# Patient Record
Sex: FEMALE | Race: WHITE | NOT HISPANIC OR LATINO
[De-identification: names, ages, dates, MRNs, and addresses within clinical notes are randomized per-mention and may not be internally consistent; named-entity substitution may affect disease eponyms.]

---

## 2017-08-30 ENCOUNTER — RESULT REVIEW (OUTPATIENT)
Age: 22
End: 2017-08-30

## 2017-10-17 PROBLEM — Z00.00 ENCOUNTER FOR PREVENTIVE HEALTH EXAMINATION: Status: ACTIVE | Noted: 2017-10-17

## 2017-11-08 ENCOUNTER — ASOB RESULT (OUTPATIENT)
Age: 22
End: 2017-11-08

## 2017-11-08 ENCOUNTER — APPOINTMENT (OUTPATIENT)
Dept: ANTEPARTUM | Facility: CLINIC | Age: 22
End: 2017-11-08
Payer: COMMERCIAL

## 2017-11-08 PROCEDURE — 76811 OB US DETAILED SNGL FETUS: CPT

## 2018-03-06 ENCOUNTER — APPOINTMENT (OUTPATIENT)
Dept: ANTEPARTUM | Facility: CLINIC | Age: 23
End: 2018-03-06
Payer: COMMERCIAL

## 2018-03-06 ENCOUNTER — ASOB RESULT (OUTPATIENT)
Age: 23
End: 2018-03-06

## 2018-03-06 PROCEDURE — 76816 OB US FOLLOW-UP PER FETUS: CPT

## 2018-03-22 ENCOUNTER — APPOINTMENT (OUTPATIENT)
Dept: ANTEPARTUM | Facility: CLINIC | Age: 23
End: 2018-03-22
Payer: COMMERCIAL

## 2018-03-22 ENCOUNTER — ASOB RESULT (OUTPATIENT)
Age: 23
End: 2018-03-22

## 2018-03-22 ENCOUNTER — OUTPATIENT (OUTPATIENT)
Dept: OUTPATIENT SERVICES | Facility: HOSPITAL | Age: 23
LOS: 1 days | End: 2018-03-22
Payer: COMMERCIAL

## 2018-03-22 DIAGNOSIS — O40.3XX0 POLYHYDRAMNIOS, THIRD TRIMESTER, NOT APPLICABLE OR UNSPECIFIED: ICD-10-CM

## 2018-03-22 DIAGNOSIS — O48.0 POST-TERM PREGNANCY: ICD-10-CM

## 2018-03-22 PROCEDURE — 76818 FETAL BIOPHYS PROFILE W/NST: CPT | Mod: 26

## 2018-03-22 PROCEDURE — 76818 FETAL BIOPHYS PROFILE W/NST: CPT

## 2018-03-26 ENCOUNTER — ASOB RESULT (OUTPATIENT)
Age: 23
End: 2018-03-26

## 2018-03-26 ENCOUNTER — APPOINTMENT (OUTPATIENT)
Dept: ANTEPARTUM | Facility: CLINIC | Age: 23
End: 2018-03-26
Payer: COMMERCIAL

## 2018-03-26 ENCOUNTER — APPOINTMENT (OUTPATIENT)
Dept: ANTEPARTUM | Facility: CLINIC | Age: 23
End: 2018-03-26

## 2018-03-26 ENCOUNTER — OUTPATIENT (OUTPATIENT)
Dept: OUTPATIENT SERVICES | Facility: HOSPITAL | Age: 23
LOS: 1 days | End: 2018-03-26
Payer: COMMERCIAL

## 2018-03-26 PROCEDURE — 76818 FETAL BIOPHYS PROFILE W/NST: CPT

## 2018-03-26 PROCEDURE — 76816 OB US FOLLOW-UP PER FETUS: CPT | Mod: 26

## 2018-03-26 PROCEDURE — 76818 FETAL BIOPHYS PROFILE W/NST: CPT | Mod: 26

## 2018-03-26 PROCEDURE — 76816 OB US FOLLOW-UP PER FETUS: CPT

## 2018-03-27 ENCOUNTER — INPATIENT (INPATIENT)
Facility: HOSPITAL | Age: 23
LOS: 1 days | Discharge: HOME | End: 2018-03-29
Attending: OBSTETRICS & GYNECOLOGY | Admitting: OBSTETRICS & GYNECOLOGY
Payer: COMMERCIAL

## 2018-03-27 VITALS
SYSTOLIC BLOOD PRESSURE: 124 MMHG | HEART RATE: 72 BPM | OXYGEN SATURATION: 100 % | RESPIRATION RATE: 18 BRPM | TEMPERATURE: 98 F | DIASTOLIC BLOOD PRESSURE: 80 MMHG

## 2018-03-27 DIAGNOSIS — Z3A.00 WEEKS OF GESTATION OF PREGNANCY NOT SPECIFIED: ICD-10-CM

## 2018-03-27 DIAGNOSIS — O26.899 OTHER SPECIFIED PREGNANCY RELATED CONDITIONS, UNSPECIFIED TRIMESTER: ICD-10-CM

## 2018-03-27 DIAGNOSIS — Z34.80 ENCOUNTER FOR SUPERVISION OF OTHER NORMAL PREGNANCY, UNSPECIFIED TRIMESTER: ICD-10-CM

## 2018-03-27 LAB
BASOPHILS # BLD AUTO: 0 K/UL — SIGNIFICANT CHANGE UP (ref 0–0.2)
BASOPHILS NFR BLD AUTO: 0.2 % — SIGNIFICANT CHANGE UP (ref 0–2)
BLD GP AB SCN SERPL QL: NEGATIVE — SIGNIFICANT CHANGE UP
EOSINOPHIL # BLD AUTO: 0 K/UL — SIGNIFICANT CHANGE UP (ref 0–0.5)
EOSINOPHIL NFR BLD AUTO: 0.2 % — SIGNIFICANT CHANGE UP (ref 0–6)
HCT VFR BLD CALC: 38.6 % — SIGNIFICANT CHANGE UP (ref 34.5–45)
HGB BLD-MCNC: 13 G/DL — SIGNIFICANT CHANGE UP (ref 11.5–15.5)
LYMPHOCYTES # BLD AUTO: 1.1 K/UL — SIGNIFICANT CHANGE UP (ref 1–3.3)
LYMPHOCYTES # BLD AUTO: 9.4 % — LOW (ref 13–44)
MCHC RBC-ENTMCNC: 28.8 PG — SIGNIFICANT CHANGE UP (ref 27–34)
MCHC RBC-ENTMCNC: 33.7 GM/DL — SIGNIFICANT CHANGE UP (ref 32–36)
MCV RBC AUTO: 85.5 FL — SIGNIFICANT CHANGE UP (ref 80–100)
MONOCYTES # BLD AUTO: 0.6 K/UL — SIGNIFICANT CHANGE UP (ref 0–0.9)
MONOCYTES NFR BLD AUTO: 5.3 % — SIGNIFICANT CHANGE UP (ref 2–14)
NEUTROPHILS # BLD AUTO: 9.6 K/UL — HIGH (ref 1.8–7.4)
NEUTROPHILS NFR BLD AUTO: 84.8 % — HIGH (ref 43–77)
PLATELET # BLD AUTO: 85 K/UL — LOW (ref 150–400)
RBC # BLD: 4.51 M/UL — SIGNIFICANT CHANGE UP (ref 3.8–5.2)
RBC # FLD: 12.4 % — SIGNIFICANT CHANGE UP (ref 10.3–14.5)
RH IG SCN BLD-IMP: POSITIVE — SIGNIFICANT CHANGE UP
WBC # BLD: 11.3 K/UL — HIGH (ref 3.8–10.5)
WBC # FLD AUTO: 11.3 K/UL — HIGH (ref 3.8–10.5)

## 2018-03-27 RX ORDER — AER TRAVELER 0.5 G/1
1 SOLUTION RECTAL; TOPICAL EVERY 4 HOURS
Qty: 0 | Refills: 0 | Status: DISCONTINUED | OUTPATIENT
Start: 2018-03-27 | End: 2018-03-29

## 2018-03-27 RX ORDER — IBUPROFEN 200 MG
600 TABLET ORAL EVERY 6 HOURS
Qty: 0 | Refills: 0 | Status: DISCONTINUED | OUTPATIENT
Start: 2018-03-27 | End: 2018-03-29

## 2018-03-27 RX ORDER — GLYCERIN ADULT
1 SUPPOSITORY, RECTAL RECTAL AT BEDTIME
Qty: 0 | Refills: 0 | Status: DISCONTINUED | OUTPATIENT
Start: 2018-03-27 | End: 2018-03-29

## 2018-03-27 RX ORDER — DIBUCAINE 1 %
1 OINTMENT (GRAM) RECTAL EVERY 4 HOURS
Qty: 0 | Refills: 0 | Status: DISCONTINUED | OUTPATIENT
Start: 2018-03-27 | End: 2018-03-29

## 2018-03-27 RX ORDER — OXYTOCIN 10 UNIT/ML
333.33 VIAL (ML) INJECTION
Qty: 20 | Refills: 0 | Status: DISCONTINUED | OUTPATIENT
Start: 2018-03-27 | End: 2018-03-27

## 2018-03-27 RX ORDER — OXYCODONE HYDROCHLORIDE 5 MG/1
5 TABLET ORAL EVERY 4 HOURS
Qty: 0 | Refills: 0 | Status: DISCONTINUED | OUTPATIENT
Start: 2018-03-27 | End: 2018-03-27

## 2018-03-27 RX ORDER — HYDROCORTISONE 1 %
1 OINTMENT (GRAM) TOPICAL EVERY 4 HOURS
Qty: 0 | Refills: 0 | Status: DISCONTINUED | OUTPATIENT
Start: 2018-03-27 | End: 2018-03-27

## 2018-03-27 RX ORDER — ACETAMINOPHEN 500 MG
975 TABLET ORAL EVERY 6 HOURS
Qty: 0 | Refills: 0 | Status: COMPLETED | OUTPATIENT
Start: 2018-03-27 | End: 2019-02-23

## 2018-03-27 RX ORDER — SODIUM CHLORIDE 9 MG/ML
1000 INJECTION, SOLUTION INTRAVENOUS ONCE
Qty: 0 | Refills: 0 | Status: DISCONTINUED | OUTPATIENT
Start: 2018-03-27 | End: 2018-03-27

## 2018-03-27 RX ORDER — MAGNESIUM HYDROXIDE 400 MG/1
30 TABLET, CHEWABLE ORAL
Qty: 0 | Refills: 0 | Status: DISCONTINUED | OUTPATIENT
Start: 2018-03-27 | End: 2018-03-29

## 2018-03-27 RX ORDER — TETANUS TOXOID, REDUCED DIPHTHERIA TOXOID AND ACELLULAR PERTUSSIS VACCINE, ADSORBED 5; 2.5; 8; 8; 2.5 [IU]/.5ML; [IU]/.5ML; UG/.5ML; UG/.5ML; UG/.5ML
0.5 SUSPENSION INTRAMUSCULAR ONCE
Qty: 0 | Refills: 0 | Status: DISCONTINUED | OUTPATIENT
Start: 2018-03-27 | End: 2018-03-29

## 2018-03-27 RX ORDER — PRAMOXINE HYDROCHLORIDE 150 MG/15G
1 AEROSOL, FOAM RECTAL EVERY 4 HOURS
Qty: 0 | Refills: 0 | Status: DISCONTINUED | OUTPATIENT
Start: 2018-03-27 | End: 2018-03-27

## 2018-03-27 RX ORDER — SODIUM CHLORIDE 9 MG/ML
3 INJECTION INTRAMUSCULAR; INTRAVENOUS; SUBCUTANEOUS EVERY 8 HOURS
Qty: 0 | Refills: 0 | Status: DISCONTINUED | OUTPATIENT
Start: 2018-03-27 | End: 2018-03-27

## 2018-03-27 RX ORDER — SIMETHICONE 80 MG/1
80 TABLET, CHEWABLE ORAL EVERY 6 HOURS
Qty: 0 | Refills: 0 | Status: DISCONTINUED | OUTPATIENT
Start: 2018-03-27 | End: 2018-03-29

## 2018-03-27 RX ORDER — SODIUM CHLORIDE 9 MG/ML
1000 INJECTION, SOLUTION INTRAVENOUS
Qty: 0 | Refills: 0 | Status: DISCONTINUED | OUTPATIENT
Start: 2018-03-27 | End: 2018-03-27

## 2018-03-27 RX ORDER — DIBUCAINE 1 %
1 OINTMENT (GRAM) RECTAL EVERY 4 HOURS
Qty: 0 | Refills: 0 | Status: DISCONTINUED | OUTPATIENT
Start: 2018-03-27 | End: 2018-03-27

## 2018-03-27 RX ORDER — AER TRAVELER 0.5 G/1
1 SOLUTION RECTAL; TOPICAL EVERY 4 HOURS
Qty: 0 | Refills: 0 | Status: DISCONTINUED | OUTPATIENT
Start: 2018-03-27 | End: 2018-03-27

## 2018-03-27 RX ORDER — PRAMOXINE HYDROCHLORIDE 150 MG/15G
1 AEROSOL, FOAM RECTAL EVERY 4 HOURS
Qty: 0 | Refills: 0 | Status: DISCONTINUED | OUTPATIENT
Start: 2018-03-27 | End: 2018-03-29

## 2018-03-27 RX ORDER — LANOLIN
1 OINTMENT (GRAM) TOPICAL EVERY 6 HOURS
Qty: 0 | Refills: 0 | Status: DISCONTINUED | OUTPATIENT
Start: 2018-03-27 | End: 2018-03-29

## 2018-03-27 RX ORDER — DOCUSATE SODIUM 100 MG
100 CAPSULE ORAL
Qty: 0 | Refills: 0 | Status: DISCONTINUED | OUTPATIENT
Start: 2018-03-27 | End: 2018-03-29

## 2018-03-27 RX ORDER — OXYTOCIN 10 UNIT/ML
41.67 VIAL (ML) INJECTION
Qty: 20 | Refills: 0 | Status: DISCONTINUED | OUTPATIENT
Start: 2018-03-27 | End: 2018-03-27

## 2018-03-27 RX ORDER — SODIUM CHLORIDE 9 MG/ML
3 INJECTION INTRAMUSCULAR; INTRAVENOUS; SUBCUTANEOUS EVERY 8 HOURS
Qty: 0 | Refills: 0 | Status: DISCONTINUED | OUTPATIENT
Start: 2018-03-27 | End: 2018-03-29

## 2018-03-27 RX ORDER — CITRIC ACID/SODIUM CITRATE 300-500 MG
15 SOLUTION, ORAL ORAL EVERY 4 HOURS
Qty: 0 | Refills: 0 | Status: DISCONTINUED | OUTPATIENT
Start: 2018-03-27 | End: 2018-03-27

## 2018-03-27 RX ORDER — IBUPROFEN 200 MG
600 TABLET ORAL EVERY 6 HOURS
Qty: 0 | Refills: 0 | Status: COMPLETED | OUTPATIENT
Start: 2018-03-27 | End: 2019-02-23

## 2018-03-27 RX ORDER — ACETAMINOPHEN 500 MG
975 TABLET ORAL EVERY 6 HOURS
Qty: 0 | Refills: 0 | Status: DISCONTINUED | OUTPATIENT
Start: 2018-03-27 | End: 2018-03-29

## 2018-03-27 RX ORDER — OXYTOCIN 10 UNIT/ML
41.67 VIAL (ML) INJECTION
Qty: 20 | Refills: 0 | Status: DISCONTINUED | OUTPATIENT
Start: 2018-03-27 | End: 2018-03-29

## 2018-03-27 RX ORDER — HYDROCORTISONE 1 %
1 OINTMENT (GRAM) TOPICAL EVERY 4 HOURS
Qty: 0 | Refills: 0 | Status: DISCONTINUED | OUTPATIENT
Start: 2018-03-27 | End: 2018-03-29

## 2018-03-27 RX ORDER — KETOROLAC TROMETHAMINE 30 MG/ML
30 SYRINGE (ML) INJECTION ONCE
Qty: 0 | Refills: 0 | Status: DISCONTINUED | OUTPATIENT
Start: 2018-03-27 | End: 2018-03-29

## 2018-03-27 RX ORDER — OXYCODONE HYDROCHLORIDE 5 MG/1
5 TABLET ORAL
Qty: 0 | Refills: 0 | Status: DISCONTINUED | OUTPATIENT
Start: 2018-03-27 | End: 2018-03-27

## 2018-03-27 RX ORDER — DIPHENHYDRAMINE HCL 50 MG
25 CAPSULE ORAL EVERY 6 HOURS
Qty: 0 | Refills: 0 | Status: DISCONTINUED | OUTPATIENT
Start: 2018-03-27 | End: 2018-03-29

## 2018-03-27 RX ADMIN — Medication 600 MILLIGRAM(S): at 19:30

## 2018-03-27 RX ADMIN — Medication 600 MILLIGRAM(S): at 17:51

## 2018-03-27 RX ADMIN — SODIUM CHLORIDE 2000 MILLILITER(S): 9 INJECTION, SOLUTION INTRAVENOUS at 10:33

## 2018-03-27 RX ADMIN — Medication 100 MILLIGRAM(S): at 20:07

## 2018-03-27 RX ADMIN — Medication 975 MILLIGRAM(S): at 18:36

## 2018-03-27 RX ADMIN — Medication 975 MILLIGRAM(S): at 23:12

## 2018-03-27 RX ADMIN — Medication 125 MILLIUNIT(S)/MIN: at 12:45

## 2018-03-27 RX ADMIN — Medication 600 MILLIGRAM(S): at 23:11

## 2018-03-27 RX ADMIN — Medication 1 TABLET(S): at 17:51

## 2018-03-27 RX ADMIN — Medication 975 MILLIGRAM(S): at 19:30

## 2018-03-28 ENCOUNTER — TRANSCRIPTION ENCOUNTER (OUTPATIENT)
Age: 23
End: 2018-03-28

## 2018-03-28 LAB
HCT VFR BLD CALC: 30.6 % — LOW (ref 34.5–45)
HGB BLD-MCNC: 10.3 G/DL — LOW (ref 11.5–15.5)
T PALLIDUM AB TITR SER: NEGATIVE — SIGNIFICANT CHANGE UP

## 2018-03-28 RX ADMIN — Medication 975 MILLIGRAM(S): at 00:05

## 2018-03-28 RX ADMIN — Medication 600 MILLIGRAM(S): at 00:05

## 2018-03-28 RX ADMIN — Medication 600 MILLIGRAM(S): at 04:43

## 2018-03-28 RX ADMIN — Medication 975 MILLIGRAM(S): at 18:54

## 2018-03-28 RX ADMIN — Medication 975 MILLIGRAM(S): at 12:30

## 2018-03-28 RX ADMIN — Medication 600 MILLIGRAM(S): at 18:48

## 2018-03-28 RX ADMIN — Medication 1 TABLET(S): at 11:47

## 2018-03-28 RX ADMIN — Medication 600 MILLIGRAM(S): at 11:47

## 2018-03-28 RX ADMIN — Medication 600 MILLIGRAM(S): at 05:38

## 2018-03-28 RX ADMIN — Medication 600 MILLIGRAM(S): at 23:04

## 2018-03-28 RX ADMIN — Medication 650 MILLIGRAM(S): at 04:45

## 2018-03-28 RX ADMIN — Medication 600 MILLIGRAM(S): at 18:54

## 2018-03-28 RX ADMIN — Medication 600 MILLIGRAM(S): at 12:30

## 2018-03-28 RX ADMIN — Medication 975 MILLIGRAM(S): at 23:04

## 2018-03-28 RX ADMIN — Medication 975 MILLIGRAM(S): at 11:47

## 2018-03-28 RX ADMIN — Medication 975 MILLIGRAM(S): at 05:37

## 2018-03-28 RX ADMIN — Medication 975 MILLIGRAM(S): at 18:49

## 2018-03-28 NOTE — PROGRESS NOTE ADULT - PROBLEM SELECTOR PLAN 1
- Continue with po analgesia  - Increase ambulation  - Continue regular diet  - IV lock  - H&H today    JONA Cortez [email or web address]

## 2018-03-28 NOTE — DISCHARGE NOTE OB - PATIENT PORTAL LINK FT
You can access the [facility] Patient Portal, offered by [facility], by registering with the following website: [email or web address]

## 2018-03-28 NOTE — DISCHARGE NOTE OB - CARE PROVIDER_API CALL
Coretta Fernandez), Obstetrics and Gynecology  [address]  [address]  [place] [address]  Phone: [phone]  Fax: [phone]

## 2018-03-28 NOTE — DISCHARGE NOTE OB - MATERIALS PROVIDED
Breastfeeding Mother’s Support Group Information/[facility]  Screening Program/Breastfeeding Log/Bottle Feeding Log/Shaken Baby Prevention Handout/Birth Certificate Instructions/Guide to Postpartum Care/[facility] Hearing Screen Program/Back To Sleep Handout/Breastfeeding Guide and Packet

## 2018-03-28 NOTE — PROGRESS NOTE ADULT - SUBJECTIVE AND OBJECTIVE BOX
Patient seen and examined at bedside, no acute overnight events. No acute complaints, pain well controlled. Patient is ambulating, voiding spontaneously, passing gas, and tolerating regular diet. Vaginal bleeding is appropriate.     Vital Signs Last 24 Hours  T(C): 36.6 (03-28-18 @ 05:00), Max: 36.7 (03-27-18 @ 11:45)  HR: 78 (03-28-18 @ 05:00) (68 - 82)  BP: 102/64 (03-28-18 @ 05:00) (102/64 - 124/80)  RR: 18 (03-28-18 @ 05:00) (16 - 18)  SpO2: 99% (03-27-18 @ 21:23) (98% - 100%)    Physical Exam:  General: NAD  Abdomen: Soft, non-tender, non-distended, fundus firm  Pelvic: Lochia wnl    Labs:    Blood Type: A Positive  Antibody Screen: --  RPR: Negative               13.0   11.3  )-----------( 85       ( 03-27 @ 10:48 )             38.6         MEDICATIONS  (STANDING):  acetaminophen   Tablet. 975 milliGRAM(s) Oral every 6 hours  diphtheria/tetanus/pertussis (acellular) Vaccine (ADAcel) 0.5 milliLiter(s) IntraMuscular once  ibuprofen  Tablet 600 milliGRAM(s) Oral every 6 hours  ketorolac   Injectable 30 milliGRAM(s) IV Push once  oxytocin Infusion 41.667 milliUNIT(s)/Min (125 mL/Hr) IV Continuous <Continuous>  prenatal multivitamin 1 Tablet(s) Oral daily  sodium chloride 0.9% lock flush 3 milliLiter(s) IV Push every 8 hours    MEDICATIONS  (PRN):  dibucaine 1% Ointment 1 Application(s) Topical every 4 hours PRN Perineal Discomfort  diphenhydrAMINE   Capsule 25 milliGRAM(s) Oral every 6 hours PRN Itching  docusate sodium 100 milliGRAM(s) Oral two times a day PRN Stool Softening  glycerin Suppository - Adult 1 Suppository(s) Rectal at bedtime PRN Constipation  hydrocortisone 1% Cream 1 Application(s) Topical every 4 hours PRN Moderate to Severe Perineal Pain  lanolin Ointment 1 Application(s) Topical every 6 hours PRN Sore Nipples  magnesium hydroxide Suspension 30 milliLiter(s) Oral two times a day PRN Constipation  pramoxine 1%/zinc 5% Cream 1 Application(s) Topical every 4 hours PRN Moderate to Severe Perineal Pain  simethicone 80 milliGRAM(s) Chew every 6 hours PRN Gas  witch hazel Pads 1 Application(s) Topical every 4 hours PRN Perineal Discomfort

## 2018-03-28 NOTE — PROGRESS NOTE ADULT - SUBJECTIVE AND OBJECTIVE BOX
VS: Vital Signs Last 24 Hrs  T(C): 36.6 (28 Mar 2018 05:00), Max: 36.7 (27 Mar 2018 11:45)  T(F): 97.9 (28 Mar 2018 05:00), Max: 98.1 (27 Mar 2018 11:45)  HR: 78 (28 Mar 2018 05:00) (68 - 82)  BP: 102/64 (28 Mar 2018 05:00) (102/64 - 124/80)  BP(mean): 90 (27 Mar 2018 12:33) (84 - 90)  RR: 18 (28 Mar 2018 05:00) (16 - 18)  SpO2: 99% (27 Mar 2018 21:23) (98% - 100%)    Abdomen soft, fundus firm  Lochia WNL  Voiding, stable

## 2018-03-29 ENCOUNTER — APPOINTMENT (OUTPATIENT)
Dept: ANTEPARTUM | Facility: CLINIC | Age: 23
End: 2018-03-29

## 2018-03-29 VITALS
TEMPERATURE: 98 F | SYSTOLIC BLOOD PRESSURE: 106 MMHG | HEART RATE: 87 BPM | RESPIRATION RATE: 18 BRPM | DIASTOLIC BLOOD PRESSURE: 71 MMHG

## 2018-03-29 PROCEDURE — 59050 FETAL MONITOR W/REPORT: CPT

## 2018-03-29 PROCEDURE — 85027 COMPLETE CBC AUTOMATED: CPT

## 2018-03-29 PROCEDURE — 86780 TREPONEMA PALLIDUM: CPT

## 2018-03-29 PROCEDURE — G0463: CPT

## 2018-03-29 PROCEDURE — 85018 HEMOGLOBIN: CPT

## 2018-03-29 PROCEDURE — 86901 BLOOD TYPING SEROLOGIC RH(D): CPT

## 2018-03-29 PROCEDURE — 59025 FETAL NON-STRESS TEST: CPT

## 2018-03-29 PROCEDURE — 86900 BLOOD TYPING SEROLOGIC ABO: CPT

## 2018-03-29 PROCEDURE — 86850 RBC ANTIBODY SCREEN: CPT

## 2018-03-29 RX ADMIN — Medication 100 MILLIGRAM(S): at 06:01

## 2018-03-29 RX ADMIN — Medication 975 MILLIGRAM(S): at 12:37

## 2018-03-29 RX ADMIN — Medication 600 MILLIGRAM(S): at 00:00

## 2018-03-29 RX ADMIN — Medication 975 MILLIGRAM(S): at 11:44

## 2018-03-29 RX ADMIN — Medication 975 MILLIGRAM(S): at 05:59

## 2018-03-29 RX ADMIN — Medication 600 MILLIGRAM(S): at 12:37

## 2018-03-29 RX ADMIN — Medication 600 MILLIGRAM(S): at 11:44

## 2018-03-29 RX ADMIN — Medication 100 MILLIGRAM(S): at 11:44

## 2018-03-29 RX ADMIN — Medication 600 MILLIGRAM(S): at 05:59

## 2018-03-29 RX ADMIN — Medication 1 TABLET(S): at 11:44

## 2018-03-29 RX ADMIN — Medication 975 MILLIGRAM(S): at 00:00

## 2018-04-20 DIAGNOSIS — Z01.818 ENCOUNTER FOR OTHER PREPROCEDURAL EXAMINATION: ICD-10-CM

## 2018-05-03 DIAGNOSIS — Z01.818 ENCOUNTER FOR OTHER PREPROCEDURAL EXAMINATION: ICD-10-CM

## 2018-05-04 DIAGNOSIS — O48.0 POST-TERM PREGNANCY: ICD-10-CM

## 2019-12-19 ENCOUNTER — APPOINTMENT (OUTPATIENT)
Dept: ANTEPARTUM | Facility: CLINIC | Age: 24
End: 2019-12-19

## 2020-01-09 ENCOUNTER — OUTPATIENT (OUTPATIENT)
Dept: OUTPATIENT SERVICES | Facility: HOSPITAL | Age: 25
LOS: 1 days | End: 2020-01-09
Payer: COMMERCIAL

## 2020-01-09 ENCOUNTER — APPOINTMENT (OUTPATIENT)
Dept: ANTEPARTUM | Facility: CLINIC | Age: 25
End: 2020-01-09
Payer: COMMERCIAL

## 2020-01-09 ENCOUNTER — ASOB RESULT (OUTPATIENT)
Age: 25
End: 2020-01-09

## 2020-01-09 DIAGNOSIS — Z01.818 ENCOUNTER FOR OTHER PREPROCEDURAL EXAMINATION: ICD-10-CM

## 2020-01-09 PROCEDURE — 76818 FETAL BIOPHYS PROFILE W/NST: CPT

## 2020-01-09 PROCEDURE — 76816 OB US FOLLOW-UP PER FETUS: CPT

## 2020-01-09 PROCEDURE — 76818 FETAL BIOPHYS PROFILE W/NST: CPT | Mod: 26

## 2020-01-10 ENCOUNTER — INPATIENT (INPATIENT)
Facility: HOSPITAL | Age: 25
LOS: 1 days | Discharge: HOME | End: 2020-01-12
Attending: OBSTETRICS & GYNECOLOGY | Admitting: OBSTETRICS & GYNECOLOGY
Payer: COMMERCIAL

## 2020-01-10 VITALS
OXYGEN SATURATION: 98 % | HEART RATE: 74 BPM | DIASTOLIC BLOOD PRESSURE: 67 MMHG | RESPIRATION RATE: 20 BRPM | TEMPERATURE: 98 F | SYSTOLIC BLOOD PRESSURE: 121 MMHG

## 2020-01-10 DIAGNOSIS — O26.899 OTHER SPECIFIED PREGNANCY RELATED CONDITIONS, UNSPECIFIED TRIMESTER: ICD-10-CM

## 2020-01-10 DIAGNOSIS — Z34.80 ENCOUNTER FOR SUPERVISION OF OTHER NORMAL PREGNANCY, UNSPECIFIED TRIMESTER: ICD-10-CM

## 2020-01-10 DIAGNOSIS — Z3A.00 WEEKS OF GESTATION OF PREGNANCY NOT SPECIFIED: ICD-10-CM

## 2020-01-10 LAB
BASOPHILS # BLD AUTO: 0 K/UL — SIGNIFICANT CHANGE UP (ref 0–0.2)
BASOPHILS NFR BLD AUTO: 0 % — SIGNIFICANT CHANGE UP (ref 0–2)
BLD GP AB SCN SERPL QL: NEGATIVE — SIGNIFICANT CHANGE UP
EOSINOPHIL # BLD AUTO: 0.25 K/UL — SIGNIFICANT CHANGE UP (ref 0–0.5)
EOSINOPHIL NFR BLD AUTO: 2 % — SIGNIFICANT CHANGE UP (ref 0–6)
HCT VFR BLD CALC: 38.8 % — SIGNIFICANT CHANGE UP (ref 34.5–45)
HGB BLD-MCNC: 11.9 G/DL — SIGNIFICANT CHANGE UP (ref 11.5–15.5)
LYMPHOCYTES # BLD AUTO: 1.86 K/UL — SIGNIFICANT CHANGE UP (ref 1–3.3)
LYMPHOCYTES # BLD AUTO: 15 % — SIGNIFICANT CHANGE UP (ref 13–44)
MCHC RBC-ENTMCNC: 26.5 PG — LOW (ref 27–34)
MCHC RBC-ENTMCNC: 30.7 GM/DL — LOW (ref 32–36)
MCV RBC AUTO: 86.4 FL — SIGNIFICANT CHANGE UP (ref 80–100)
MONOCYTES # BLD AUTO: 0.25 K/UL — SIGNIFICANT CHANGE UP (ref 0–0.9)
MONOCYTES NFR BLD AUTO: 2 % — SIGNIFICANT CHANGE UP (ref 2–14)
NEUTROPHILS # BLD AUTO: 9.93 K/UL — HIGH (ref 1.8–7.4)
NEUTROPHILS NFR BLD AUTO: 78 % — HIGH (ref 43–77)
PLATELET # BLD AUTO: 107 K/UL — LOW (ref 150–400)
RBC # BLD: 4.49 M/UL — SIGNIFICANT CHANGE UP (ref 3.8–5.2)
RBC # FLD: 13.9 % — SIGNIFICANT CHANGE UP (ref 10.3–14.5)
RH IG SCN BLD-IMP: POSITIVE — SIGNIFICANT CHANGE UP
WBC # BLD: 12.41 K/UL — HIGH (ref 3.8–10.5)
WBC # FLD AUTO: 12.41 K/UL — HIGH (ref 3.8–10.5)

## 2020-01-10 RX ORDER — DIPHENHYDRAMINE HCL 50 MG
25 CAPSULE ORAL EVERY 6 HOURS
Refills: 0 | Status: DISCONTINUED | OUTPATIENT
Start: 2020-01-10 | End: 2020-01-12

## 2020-01-10 RX ORDER — PRAMOXINE HYDROCHLORIDE 150 MG/15G
1 AEROSOL, FOAM RECTAL EVERY 4 HOURS
Refills: 0 | Status: DISCONTINUED | OUTPATIENT
Start: 2020-01-10 | End: 2020-01-12

## 2020-01-10 RX ORDER — DIBUCAINE 1 %
1 OINTMENT (GRAM) RECTAL EVERY 6 HOURS
Refills: 0 | Status: DISCONTINUED | OUTPATIENT
Start: 2020-01-10 | End: 2020-01-12

## 2020-01-10 RX ORDER — OXYTOCIN 10 UNIT/ML
333.33 VIAL (ML) INJECTION
Qty: 20 | Refills: 0 | Status: DISCONTINUED | OUTPATIENT
Start: 2020-01-10 | End: 2020-01-10

## 2020-01-10 RX ORDER — IBUPROFEN 200 MG
600 TABLET ORAL EVERY 6 HOURS
Refills: 0 | Status: DISCONTINUED | OUTPATIENT
Start: 2020-01-10 | End: 2020-01-12

## 2020-01-10 RX ORDER — SODIUM CHLORIDE 9 MG/ML
3 INJECTION INTRAMUSCULAR; INTRAVENOUS; SUBCUTANEOUS EVERY 8 HOURS
Refills: 0 | Status: DISCONTINUED | OUTPATIENT
Start: 2020-01-10 | End: 2020-01-12

## 2020-01-10 RX ORDER — BENZOCAINE 10 %
1 GEL (GRAM) MUCOUS MEMBRANE EVERY 6 HOURS
Refills: 0 | Status: DISCONTINUED | OUTPATIENT
Start: 2020-01-10 | End: 2020-01-12

## 2020-01-10 RX ORDER — LANOLIN
1 OINTMENT (GRAM) TOPICAL EVERY 6 HOURS
Refills: 0 | Status: DISCONTINUED | OUTPATIENT
Start: 2020-01-10 | End: 2020-01-12

## 2020-01-10 RX ORDER — GLYCERIN ADULT
1 SUPPOSITORY, RECTAL RECTAL AT BEDTIME
Refills: 0 | Status: DISCONTINUED | OUTPATIENT
Start: 2020-01-10 | End: 2020-01-12

## 2020-01-10 RX ORDER — ACETAMINOPHEN 500 MG
975 TABLET ORAL
Refills: 0 | Status: DISCONTINUED | OUTPATIENT
Start: 2020-01-10 | End: 2020-01-12

## 2020-01-10 RX ORDER — SIMETHICONE 80 MG/1
80 TABLET, CHEWABLE ORAL EVERY 4 HOURS
Refills: 0 | Status: DISCONTINUED | OUTPATIENT
Start: 2020-01-10 | End: 2020-01-12

## 2020-01-10 RX ORDER — IBUPROFEN 200 MG
600 TABLET ORAL EVERY 6 HOURS
Refills: 0 | Status: COMPLETED | OUTPATIENT
Start: 2020-01-10 | End: 2020-12-08

## 2020-01-10 RX ORDER — MAGNESIUM HYDROXIDE 400 MG/1
30 TABLET, CHEWABLE ORAL
Refills: 0 | Status: DISCONTINUED | OUTPATIENT
Start: 2020-01-10 | End: 2020-01-12

## 2020-01-10 RX ORDER — HYDROCORTISONE 1 %
1 OINTMENT (GRAM) TOPICAL EVERY 6 HOURS
Refills: 0 | Status: DISCONTINUED | OUTPATIENT
Start: 2020-01-10 | End: 2020-01-12

## 2020-01-10 RX ORDER — CITRIC ACID/SODIUM CITRATE 300-500 MG
15 SOLUTION, ORAL ORAL EVERY 6 HOURS
Refills: 0 | Status: DISCONTINUED | OUTPATIENT
Start: 2020-01-10 | End: 2020-01-10

## 2020-01-10 RX ORDER — AER TRAVELER 0.5 G/1
1 SOLUTION RECTAL; TOPICAL EVERY 4 HOURS
Refills: 0 | Status: DISCONTINUED | OUTPATIENT
Start: 2020-01-10 | End: 2020-01-12

## 2020-01-10 RX ADMIN — Medication 600 MILLIGRAM(S): at 15:07

## 2020-01-10 RX ADMIN — Medication 600 MILLIGRAM(S): at 09:24

## 2020-01-10 RX ADMIN — Medication 975 MILLIGRAM(S): at 18:37

## 2020-01-10 RX ADMIN — Medication 600 MILLIGRAM(S): at 16:00

## 2020-01-10 RX ADMIN — Medication 1000 MILLIUNIT(S)/MIN: at 12:02

## 2020-01-10 RX ADMIN — Medication 600 MILLIGRAM(S): at 20:53

## 2020-01-10 RX ADMIN — Medication 975 MILLIGRAM(S): at 23:55

## 2020-01-10 RX ADMIN — Medication 975 MILLIGRAM(S): at 12:51

## 2020-01-10 RX ADMIN — Medication 600 MILLIGRAM(S): at 21:50

## 2020-01-11 LAB
HCT VFR BLD CALC: 35 % — SIGNIFICANT CHANGE UP (ref 34.5–45)
HGB BLD-MCNC: 10.8 G/DL — LOW (ref 11.5–15.5)

## 2020-01-11 RX ADMIN — Medication 975 MILLIGRAM(S): at 00:45

## 2020-01-11 RX ADMIN — Medication 600 MILLIGRAM(S): at 16:05

## 2020-01-11 RX ADMIN — Medication 600 MILLIGRAM(S): at 10:14

## 2020-01-11 RX ADMIN — Medication 600 MILLIGRAM(S): at 03:45

## 2020-01-11 RX ADMIN — Medication 1 TABLET(S): at 12:42

## 2020-01-11 RX ADMIN — Medication 975 MILLIGRAM(S): at 20:55

## 2020-01-11 RX ADMIN — Medication 600 MILLIGRAM(S): at 16:40

## 2020-01-11 RX ADMIN — Medication 600 MILLIGRAM(S): at 02:42

## 2020-01-11 RX ADMIN — Medication 975 MILLIGRAM(S): at 06:34

## 2020-01-11 RX ADMIN — Medication 975 MILLIGRAM(S): at 13:43

## 2020-01-11 RX ADMIN — Medication 975 MILLIGRAM(S): at 20:25

## 2020-01-11 RX ADMIN — Medication 600 MILLIGRAM(S): at 23:27

## 2020-01-11 RX ADMIN — Medication 975 MILLIGRAM(S): at 12:42

## 2020-01-11 RX ADMIN — Medication 975 MILLIGRAM(S): at 07:10

## 2020-01-11 RX ADMIN — Medication 600 MILLIGRAM(S): at 10:44

## 2020-01-11 NOTE — PROGRESS NOTE ADULT - SUBJECTIVE AND OBJECTIVE BOX
Postpartum Note- PPD#1    Allergies    morphine (Short breath)    Intolerances        Blood Type  A  --  Positive  Rubella Immune  RPR Negative    S:Patient is a  24y         PPD#1 S/P      Patient w/o complaints, pain is controlled.    Pt is OOB, tolerating PO, passing flatus. Lochia WNL.   Feeding: Breast feeding    O:  Vital Signs Last 24 Hrs  T(C): 36.8 (2020 05:00), Max: 36.9 (10 Mark 2020 17:43)  T(F): 98.3 (2020 05:00), Max: 98.4 (10 Mark 2020 17:43)  HR: 82 (2020 05:00) (62 - 85)  BP: 109/72 (2020 05:00) (109/72 - 130/72)  BP(mean): 94 (10 Mark 2020 11:45) (89 - 95)  RR: 18 (2020 05:00) (16 - 18)  SpO2: 97% (2020 05:00) (96% - 99%)     Gen: NAD  CV: rrr s1s2, CTABL  Abdomen: Soft, nontender, non-distended, fundus firm.  Lochia: WNL  Perineum: 2nd degree  Ext: Neg edema, Neg calf tenderness.  Pedal pulses palpated B/L    LABS:    Hemoglobin: 11.9 g/dL (01-10 @ 12:52)      Hematocrit: 38.8 % (01-10 @ 12:52)      A/P:  24y  PPD # 1      S/P         doing well    PMHx: Morphine allergy (anaphylaxis)  Current Issues:     Increase OOB  Regular diet  PO Pain protocol  AM H&H  Routine Postpartum Care      Megha Burgess PA-C

## 2020-01-12 ENCOUNTER — TRANSCRIPTION ENCOUNTER (OUTPATIENT)
Age: 25
End: 2020-01-12

## 2020-01-12 VITALS
TEMPERATURE: 97 F | DIASTOLIC BLOOD PRESSURE: 78 MMHG | RESPIRATION RATE: 18 BRPM | OXYGEN SATURATION: 98 % | SYSTOLIC BLOOD PRESSURE: 119 MMHG | HEART RATE: 73 BPM

## 2020-01-12 PROCEDURE — 85027 COMPLETE CBC AUTOMATED: CPT

## 2020-01-12 PROCEDURE — 86780 TREPONEMA PALLIDUM: CPT

## 2020-01-12 PROCEDURE — 86900 BLOOD TYPING SEROLOGIC ABO: CPT

## 2020-01-12 PROCEDURE — 85018 HEMOGLOBIN: CPT

## 2020-01-12 PROCEDURE — 85014 HEMATOCRIT: CPT

## 2020-01-12 PROCEDURE — 86901 BLOOD TYPING SEROLOGIC RH(D): CPT

## 2020-01-12 PROCEDURE — 59025 FETAL NON-STRESS TEST: CPT

## 2020-01-12 PROCEDURE — 59050 FETAL MONITOR W/REPORT: CPT

## 2020-01-12 PROCEDURE — G0463: CPT

## 2020-01-12 PROCEDURE — 86850 RBC ANTIBODY SCREEN: CPT

## 2020-01-12 RX ADMIN — Medication 975 MILLIGRAM(S): at 12:30

## 2020-01-12 RX ADMIN — Medication 600 MILLIGRAM(S): at 00:00

## 2020-01-12 RX ADMIN — Medication 975 MILLIGRAM(S): at 02:18

## 2020-01-12 RX ADMIN — Medication 1 TABLET(S): at 11:30

## 2020-01-12 RX ADMIN — Medication 600 MILLIGRAM(S): at 15:00

## 2020-01-12 RX ADMIN — Medication 975 MILLIGRAM(S): at 02:48

## 2020-01-12 RX ADMIN — Medication 600 MILLIGRAM(S): at 05:33

## 2020-01-12 RX ADMIN — Medication 975 MILLIGRAM(S): at 11:30

## 2020-01-12 RX ADMIN — Medication 600 MILLIGRAM(S): at 07:00

## 2020-01-12 RX ADMIN — Medication 600 MILLIGRAM(S): at 14:42

## 2020-01-12 NOTE — PROGRESS NOTE ADULT - SUBJECTIVE AND OBJECTIVE BOX
Vital Signs Last 24 Hrs  T(C): 36.3 (12 Jan 2020 05:00), Max: 36.8 (11 Jan 2020 18:00)  T(F): 97.3 (12 Jan 2020 05:00), Max: 98.2 (11 Jan 2020 18:00)  HR: 73 (12 Jan 2020 05:00) (73 - 81)  BP: 119/78 (12 Jan 2020 05:00) (111/75 - 119/78)  BP(mean): --  RR: 18 (12 Jan 2020 05:00) (18 - 18)  SpO2: 98% (12 Jan 2020 05:00) (98% - 98%)    Abdomen soft  Fundus Firm  Lochia WNL  Voiding  Stable

## 2020-01-12 NOTE — DISCHARGE NOTE OB - PATIENT PORTAL LINK FT
You can access the FollowMyHealth Patient Portal offered by [facility] by registering at the following website: [email or web address]. By joining [facility]’s FollowMyHealth portal, you will also be able to view your health information using other applications (apps) compatible with our system.

## 2020-01-13 ENCOUNTER — APPOINTMENT (OUTPATIENT)
Dept: ANTEPARTUM | Facility: CLINIC | Age: 25
End: 2020-01-13

## 2020-01-13 LAB — T PALLIDUM AB TITR SER: NEGATIVE — SIGNIFICANT CHANGE UP

## 2020-02-11 ENCOUNTER — APPOINTMENT (OUTPATIENT)
Dept: UROGYNECOLOGY | Facility: CLINIC | Age: 25
End: 2020-02-11

## 2020-05-21 ENCOUNTER — RESULT REVIEW (OUTPATIENT)
Age: 25
End: 2020-05-21

## 2021-10-12 ENCOUNTER — RESULT REVIEW (OUTPATIENT)
Age: 26
End: 2021-10-12

## 2021-12-29 ENCOUNTER — RESULT REVIEW (OUTPATIENT)
Age: 26
End: 2021-12-29

## 2022-02-25 ENCOUNTER — APPOINTMENT (OUTPATIENT)
Dept: ANTEPARTUM | Facility: CLINIC | Age: 27
End: 2022-02-25
Payer: COMMERCIAL

## 2022-02-25 ENCOUNTER — LABORATORY RESULT (OUTPATIENT)
Age: 27
End: 2022-02-25

## 2022-02-25 ENCOUNTER — ASOB RESULT (OUTPATIENT)
Age: 27
End: 2022-02-25

## 2022-02-25 PROCEDURE — 76801 OB US < 14 WKS SINGLE FETUS: CPT

## 2022-02-25 PROCEDURE — 76813 OB US NUCHAL MEAS 1 GEST: CPT | Mod: 59

## 2022-04-14 ENCOUNTER — APPOINTMENT (OUTPATIENT)
Dept: ANTEPARTUM | Facility: CLINIC | Age: 27
End: 2022-04-14

## 2022-04-20 ENCOUNTER — APPOINTMENT (OUTPATIENT)
Dept: ANTEPARTUM | Facility: CLINIC | Age: 27
End: 2022-04-20
Payer: COMMERCIAL

## 2022-04-20 ENCOUNTER — ASOB RESULT (OUTPATIENT)
Age: 27
End: 2022-04-20

## 2022-04-20 PROCEDURE — 76811 OB US DETAILED SNGL FETUS: CPT

## 2022-05-05 ENCOUNTER — APPOINTMENT (OUTPATIENT)
Dept: ANTEPARTUM | Facility: CLINIC | Age: 27
End: 2022-05-05
Payer: COMMERCIAL

## 2022-05-05 ENCOUNTER — ASOB RESULT (OUTPATIENT)
Age: 27
End: 2022-05-05

## 2022-05-05 PROCEDURE — 76815 OB US LIMITED FETUS(S): CPT

## 2022-05-20 ENCOUNTER — APPOINTMENT (OUTPATIENT)
Dept: ANTEPARTUM | Facility: CLINIC | Age: 27
End: 2022-05-20
Payer: COMMERCIAL

## 2022-05-20 ENCOUNTER — ASOB RESULT (OUTPATIENT)
Age: 27
End: 2022-05-20

## 2022-05-20 PROCEDURE — 76816 OB US FOLLOW-UP PER FETUS: CPT

## 2023-10-04 NOTE — PATIENT PROFILE OB - MATERNAL EMPLOYMENT STATUS, OB PROFILE
Future Appointments   Date Time Provider May Mcmillan   10/16/2023 11:00 AM Susanna Pizano MD [facility] [address]   10/24/2023 12:55 PM BOO ANTONY SGIEDW None   11/2/2023 10:00 AM PERRY Toro SGINP [facility] SUB GI    Pt is on wait list as well for sooner appt.
Spoke to pt for TCM today. She is requesting an appt. TCM appt recommended by 10/10/23 as pt is a high risk for readmission. There are no available appts by this timeframe. Please assist pt in scheduling appt. Pt is expecting a return call today to schedule.  Thank you    BOOK BY DATE (last date for TCM): 10/17/23
employed full time (describe)...

## 2024-05-08 NOTE — DISCHARGE NOTE OB - DO NOT DRIVE OR OPERATE HEAVY MACHINERY WHEN TAKING NARCOTICS/PRESCRIPTION PAIN MEDICATION THAT CAN CHANGE YOUR MENTAL STATE OR CAUSE DROWSINESS
*   ADEQUATE   FLUID  INTAKE   AND  ELECTROLYTE  BALANCE             * KEEP  DAIRY  OF   THE  NEUROLOGICAL  SYMPTOMS          *  TO  MAINTAIN  REGULAR  SLEEP  WAKE  CYCLES.     *   TO  HAVE  ADEQUATE  REST  AND   SLEEP    HOURS.          *    AVOID  USAGE OF   TOBACCO,  EXCESSIVE  ALCOHOL                AND   ILLEGAL   SUBSTANCES,  IF  ANY          *  Maintain   Healthy  Life Style    With   Periodic  Monitoring  Of         Any  Medical  Conditions  Including   Elevated  Blood  Pressure,  Lipid  Profile,       Blood  Sugar levels  And   Heart  Disease.                *   Period   Screening  For  Cancers  Involving  Breast,  Colon,         Lungs  And  Other  Organs  As  Applicable,           In consultation   With  Your  Primary Care Providers.                *  If   There is  Any  Significant  Worsening   Of  Current  Symptoms  And             Or  If    Any additional  New  Neurological  Symptoms  and          Significant  Concerns   Should  Call  911 or  Go  To  Emergency  Department            For  Further  Immediate  Evaluation.       Statement Selected